# Patient Record
Sex: MALE | Race: WHITE | NOT HISPANIC OR LATINO | Employment: STUDENT | URBAN - METROPOLITAN AREA
[De-identification: names, ages, dates, MRNs, and addresses within clinical notes are randomized per-mention and may not be internally consistent; named-entity substitution may affect disease eponyms.]

---
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
## 2024-06-20 ENCOUNTER — OFFICE VISIT (OUTPATIENT)
Dept: FAMILY MEDICINE CLINIC | Facility: CLINIC | Age: 19
End: 2024-06-20
Payer: COMMERCIAL

## 2024-06-20 VITALS
BODY MASS INDEX: 24.87 KG/M2 | OXYGEN SATURATION: 99 % | WEIGHT: 200 LBS | HEART RATE: 77 BPM | DIASTOLIC BLOOD PRESSURE: 70 MMHG | SYSTOLIC BLOOD PRESSURE: 116 MMHG | HEIGHT: 75 IN

## 2024-06-20 DIAGNOSIS — Z00.00 PREVENTATIVE HEALTH CARE: Primary | ICD-10-CM

## 2024-06-20 DIAGNOSIS — Z00.00 ANNUAL PHYSICAL EXAM: ICD-10-CM

## 2024-06-20 PROCEDURE — 99395 PREV VISIT EST AGE 18-39: CPT | Performed by: FAMILY MEDICINE

## 2024-06-20 NOTE — ASSESSMENT & PLAN NOTE
UTD on all vaccines.  Patient is requesting STD screening, ordered today.  Patient advised to check with insurance before he goes for the screening

## 2024-06-20 NOTE — PROGRESS NOTES
Adult Annual Physical  Name: Samuel Doyle      : 2005      MRN: [id number]  Encounter Provider: Damaris Skinner MD  Encounter Date: 2024   Encounter department: [facility]    Assessment & Plan   1. Preventative health care  Assessment & Plan:  UTD on all vaccines.  Patient is requesting STD screening, ordered today.  Patient advised to check with insurance before he goes for the screening  Orders:  -     Comprehensive metabolic panel  -     Lipid panel  -     Chlamydia/GC amplified DNA by PCR; Future  -     HIV 1/2 AG/AB w Reflex [facility] for 2 yr old and above; Future  -     RPR-Syphilis Screening (Total Syphilis IGG/IGM); Future  -     Hepatitis B surface antigen; Future  -     Hepatitis C antibody; Future  -     HSV TYPE 1,2 DNA PCR; Future    Immunizations and preventive care screenings were discussed with patient today. Appropriate education was printed on patient's after visit summary.    Counseling:  Dental Health: discussed importance of regular tooth brushing, flossing, and dental visits.  Exercise: the importance of regular exercise/physical activity was discussed. Recommend exercise 3-5 times per week for at least 30 minutes.       Depression Screening and Follow-up Plan: Patient was screened for depression during today's encounter. They screened negative with a PHQ-2 score of 0.        History of Present Illness     Patient is here for annual physical.  Does not have any specific questions or concerns today.  Requesting labs to follow-up on his lipid panel which was borderline high last year.  Patient has been trying to eat healthy and exercise regularly.  Also requesting to be screened for STD due to concerns of exposure.      Adult Annual Physical:  Patient presents for annual physical.     Diet and Physical Activity:  - Diet/Nutrition: well balanced diet.  - Exercise: walking.    Depression Screening:  - PHQ-2 Score: 0    General Health:  - Sleep: sleeps well.  -  "Hearing: normal hearing bilateral ears.  - Vision: no vision problems.  - Dental: regular dental visits.    Review of Systems   Constitutional: Negative.    Respiratory: Negative.     Cardiovascular: Negative.      No current outpatient medications on file prior to visit.     No current facility-administered medications on file prior to visit.        Objective     /70   Pulse 77   Ht 6' 3\" (1.905 m)   Wt 90.7 kg (200 lb)   SpO2 99%   BMI 25.00 kg/m²     Physical Exam  Constitutional:       Appearance: Normal appearance.   HENT:      Right Ear: Tympanic membrane normal.      Left Ear: Tympanic membrane normal.      Mouth/Throat:      Pharynx: No posterior oropharyngeal erythema.   Eyes:      Pupils: Pupils are equal, round, and reactive to light.   Cardiovascular:      Heart sounds: Normal heart sounds.   Pulmonary:      Breath sounds: Normal breath sounds.   Abdominal:      Palpations: Abdomen is soft.   Musculoskeletal:      Right lower leg: No edema.      Left lower leg: No edema.   Neurological:      Mental Status: He is oriented to person, place, and time.   Psychiatric:         Mood and Affect: Mood normal.       Administrative Statements         "

## 2024-06-26 LAB
ALBUMIN SERPL-MCNC: 4.4 G/DL (ref 4.3–5.2)
ALP SERPL-CCNC: 105 IU/L (ref 51–125)
ALT SERPL-CCNC: 22 IU/L (ref 0–44)
AST SERPL-CCNC: 27 IU/L (ref 0–40)
BILIRUB SERPL-MCNC: 0.6 MG/DL (ref 0–1.2)
BUN SERPL-MCNC: 22 MG/DL (ref 6–20)
BUN/CREAT SERPL: 18 (ref 9–20)
C TRACH RRNA SPEC QL NAA+PROBE: NEGATIVE
CALCIUM SERPL-MCNC: 9.1 MG/DL (ref 8.7–10.2)
CHLORIDE SERPL-SCNC: 104 MMOL/L (ref 96–106)
CHOLEST SERPL-MCNC: 225 MG/DL (ref 100–169)
CO2 SERPL-SCNC: 24 MMOL/L (ref 20–29)
CREAT SERPL-MCNC: 1.24 MG/DL (ref 0.76–1.27)
EGFR: 86 ML/MIN/1.73
GLOBULIN SER-MCNC: 2.3 G/DL (ref 1.5–4.5)
GLUCOSE SERPL-MCNC: 91 MG/DL (ref 70–99)
HBV SURFACE AG SERPL QL IA: NEGATIVE
HCV AB S/CO SERPL IA: NON REACTIVE
HDLC SERPL-MCNC: 58 MG/DL
HIV 1+2 AB+HIV1 P24 AG SERPL QL IA: NON REACTIVE
HSV1 DNA SPEC QL NAA+PROBE: NEGATIVE
HSV2 DNA SPEC QL NAA+PROBE: NEGATIVE
LDLC SERPL CALC-MCNC: 156 MG/DL (ref 0–109)
N GONORRHOEA RRNA SPEC QL NAA+PROBE: NEGATIVE
POTASSIUM SERPL-SCNC: 4.5 MMOL/L (ref 3.5–5.2)
PROT SERPL-MCNC: 6.7 G/DL (ref 6–8.5)
SL AMB VLDL CHOLESTEROL CALC: 11 MG/DL (ref 5–40)
SODIUM SERPL-SCNC: 140 MMOL/L (ref 134–144)
TRIGL SERPL-MCNC: 65 MG/DL (ref 0–89)

## 2024-08-30 ENCOUNTER — TELEPHONE (OUTPATIENT)
Dept: FAMILY MEDICINE CLINIC | Facility: CLINIC | Age: 19
End: 2024-08-30

## 2024-08-30 NOTE — TELEPHONE ENCOUNTER
----- Message from Damaris Skinner MD sent at 8/29/2024  6:52 PM EDT -----  Please call the patient back with results, all normal except cholesterol which continues to be elevated.  I would emphasize low-fat, plant-based diet and to continue monitoring lipid panel to ensure that it improves.

## 2025-06-23 ENCOUNTER — OFFICE VISIT (OUTPATIENT)
Dept: FAMILY MEDICINE CLINIC | Facility: CLINIC | Age: 20
End: 2025-06-23
Payer: COMMERCIAL

## 2025-06-23 VITALS
DIASTOLIC BLOOD PRESSURE: 74 MMHG | OXYGEN SATURATION: 99 % | HEART RATE: 87 BPM | BODY MASS INDEX: 24.87 KG/M2 | SYSTOLIC BLOOD PRESSURE: 122 MMHG | HEIGHT: 75 IN | WEIGHT: 200 LBS

## 2025-06-23 DIAGNOSIS — Z13.6 SCREENING FOR CARDIOVASCULAR CONDITION: ICD-10-CM

## 2025-06-23 DIAGNOSIS — Z13.0 SCREENING FOR DEFICIENCY ANEMIA: ICD-10-CM

## 2025-06-23 DIAGNOSIS — E55.9 VITAMIN D DEFICIENCY: ICD-10-CM

## 2025-06-23 DIAGNOSIS — Z11.3 SCREENING FOR STDS (SEXUALLY TRANSMITTED DISEASES): ICD-10-CM

## 2025-06-23 DIAGNOSIS — Z13.1 SCREENING FOR DIABETES MELLITUS: ICD-10-CM

## 2025-06-23 DIAGNOSIS — Z00.00 ANNUAL PHYSICAL EXAM: Primary | ICD-10-CM

## 2025-06-23 DIAGNOSIS — Z13.29 SCREENING FOR THYROID DISORDER: ICD-10-CM

## 2025-06-23 PROBLEM — M22.41 CHONDROMALACIA OF PATELLA, RIGHT: Status: ACTIVE | Noted: 2024-03-25

## 2025-06-23 PROBLEM — L91.8 CUTANEOUS SKIN TAGS: Status: RESOLVED | Noted: 2023-06-22 | Resolved: 2025-06-23

## 2025-06-23 PROBLEM — Z23 NEED FOR VACCINATION: Status: RESOLVED | Noted: 2022-05-24 | Resolved: 2025-06-23

## 2025-06-23 PROCEDURE — 99212 OFFICE O/P EST SF 10 MIN: CPT

## 2025-06-23 PROCEDURE — 99395 PREV VISIT EST AGE 18-39: CPT

## 2025-06-23 NOTE — PATIENT INSTRUCTIONS
"Patient Education     Routine physical for adults   The Basics   Written by the doctors and editors at [facility]   What is a physical? -- A physical is a routine visit, or \"check-up,\" with your doctor. You might also hear it called a \"wellness visit\" or \"preventive visit.\"  During each visit, the doctor will:   Ask about your physical and mental health   Ask about your habits, behaviors, and lifestyle   Do an exam   Give you vaccines if needed   Talk to you about any medicines you take   Give advice about your health   Answer your questions  Getting regular check-ups is an important part of taking care of your health. It can help your doctor find and treat any problems you have. But it's also important for preventing health problems.  A routine physical is different from a \"sick visit.\" A sick visit is when you see a doctor because of a health concern or problem. Since physicals are scheduled ahead of time, you can think about what you want to ask the doctor.  How often should I get a physical? -- It depends on your age and health. In general, for people age 21 years and older:   If you are younger than 50 years, you might be able to get a physical every 3 years.   If you are 50 years or older, your doctor might recommend a physical every year.  If you have an ongoing health condition, like diabetes or high blood pressure, your doctor will probably want to see you more often.  What happens during a physical? -- In general, each visit will include:   Physical exam - The doctor or nurse will check your height, weight, heart rate, and blood pressure. They will also look at your eyes and ears. They will ask about how you are feeling and whether you have any symptoms that bother you.   Medicines - It's a good idea to bring a list of all the medicines you take to each doctor visit. Your doctor will talk to you about your medicines and answer any questions. Tell them if you are having any side effects that bother you. You " "should also tell them if you are having trouble paying for any of your medicines.   Habits and behaviors - This includes:   Your diet   Your exercise habits   Whether you smoke, drink alcohol, or use drugs   Whether you are sexually active   Whether you feel safe at home  Your doctor will talk to you about things you can do to improve your health and lower your risk of health problems. They will also offer help and support. For example, if you want to quit smoking, they can give you advice and might prescribe medicines. If you want to improve your diet or get more physical activity, they can help you with this, too.   Lab tests, if needed - The tests you get will depend on your age and situation. For example, your doctor might want to check your:   Cholesterol   Blood sugar   Iron level   Vaccines - The recommended vaccines will depend on your age, health, and what vaccines you already had. Vaccines are very important because they can prevent certain serious or deadly infections.   Discussion of screening - \"Screening\" means checking for diseases or other health problems before they cause symptoms. Your doctor can recommend screening based on your age, risk, and preferences. This might include tests to check for:   Cancer, such as breast, prostate, cervical, ovarian, colorectal, prostate, lung, or skin cancer   Sexually transmitted infections, such as chlamydia and gonorrhea   Mental health conditions like depression and anxiety  Your doctor will talk to you about the different types of screening tests. They can help you decide which screenings to have. They can also explain what the results might mean.   Answering questions - The physical is a good time to ask the doctor or nurse questions about your health. If needed, they can refer you to other doctors or specialists, too.  Adults older than 65 years often need other care, too. As you get older, your doctor will talk to you about:   How to prevent falling at " home   Hearing or vision tests   Memory testing   How to take your medicines safely   Making sure that you have the help and support you need at home  All topics are updated as new evidence becomes available and our peer review process is complete.  This topic retrieved from [employer] on: May 02, 2024.  Topic 598641 Version 1.0  Release: 32.4.3 - C32.122  © 2024 UpToDate, Inc. and/or its affiliates. All rights reserved.  Consumer Information Use and Disclaimer   Disclaimer: This generalized information is a limited summary of diagnosis, treatment, and/or medication information. It is not meant to be comprehensive and should be used as a tool to help the user understand and/or assess potential diagnostic and treatment options. It does NOT include all information about conditions, treatments, medications, side effects, or risks that may apply to a specific patient. It is not intended to be medical advice or a substitute for the medical advice, diagnosis, or treatment of a health care provider based on the health care provider's examination and assessment of a patient's specific and unique circumstances. Patients must speak with a health care provider for complete information about their health, medical questions, and treatment options, including any risks or benefits regarding use of medications. This information does not endorse any treatments or medications as safe, effective, or approved for treating a specific patient. UpToDate, Inc. and its affiliates disclaim any warranty or liability relating to this information or the use thereof.The use of this information is governed by the Terms of Use, available at https://www.woltersAppknoxuwer.com/en/know/clinical-effectiveness-terms. 2024© UpToDate, Inc. and its affiliates and/or licensors. All rights reserved.  Copyright   © 2024 UpToDate, Inc. and/or its affiliates. All rights reserved.

## 2025-06-23 NOTE — PROGRESS NOTES
Adult Annual Physical  Name: Samuel Doyle      : 2005      MRN: [id number]  Encounter Provider: PRINCESS Paredes  Encounter Date: 2025   Encounter department: [facility]    :  Assessment & Plan  Annual physical exam  Routine labs ordered, will f/u with abnormalities as needed. Routine immunizations UTD, will receive influenza vaccine at school in the fall. F/u in 1 year or sooner as needed.       Screening for diabetes mellitus  No prior known history, will screen to rule out.  Orders:    Comprehensive metabolic panel; Future    Hemoglobin A1C; Future    Screening for deficiency anemia  CBC last checked in , will re-check panel.   Orders:    CBC and differential; Future    Screening for thyroid disorder  No prior known history, will screen to rule out.  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 Will re-check lipid panel. Discussed OTC supplements - omega 3, niacin. ASCVD risk <5%.   Orders:    Lipid Panel with Direct LDL reflex; Future    Screening for STDs (sexually transmitted diseases)  Per patient request, will f/u as needed.   Orders:    Chlamydia/GC amplified DNA by PCR; Future    HSV TYPE 1,2 DNA PCR; Future    Testosterone, free, total; Future    RPR-Syphilis Screening (Total Syphilis IGG/IGM); Future    Vitamin D deficiency  Last checked in , will repeat testing.  Orders:    Vitamin D 25 hydroxy; Future             Preventive Screenings:  - Diabetes Screening: orders placed  - Cholesterol Screening: orders placed   - Hepatitis C screening: screening up-to-date   - HIV screening: screening up-to-date   - Colon cancer screening: screening not indicated   - Lung cancer screening: screening not indicated   - Prostate cancer screening: screening not indicated     Immunizations:  - Immunizations due:  Influenza  - The patient declines recommended vaccines currently despite my recommendations      Counseling/Anticipatory Guidance:  - Alcohol: discussed moderation in alcohol intake and recommendations for healthy alcohol use.   - Drug use: discussed harms of illicit drug use and how it can negatively impact mental/physical health.   - Tobacco use: discussed harms of tobacco use and management options for quitting.   - Dental health: discussed importance of regular tooth brushing, flossing, and dental visits.   - Sexual health: discussed sexually transmitted diseases, partner selection, use of condoms, avoidance of unintended pregnancy, and contraceptive alternatives.   - Diet: discussed recommendations for a healthy/well-balanced diet.   - Exercise: the importance of regular exercise/physical activity was discussed. Recommend exercise 3-5 times per week for at least 30 minutes.   - Injury prevention: discussed safety/seat belts, safety helmets, smoke detectors, carbon monoxide detectors, and smoking near bedding or upholstery.       Depression Screening and Follow-up Plan: Patient was screened for depression during today's encounter. They screened negative with a PHQ-2 score of 0.          History of Present Illness     Adult Annual Physical:  Patient presents for annual physical. 19 year old male presents for annual wellness exam/transfer of care from another provider in the practice. He denies any known medical problems or conditions but reports his lipid panel has been elevated with routine labs. He does not take any daily medications. 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 Will be returning to school next week for summer program. He denies CP or SOB with activity. Denies HA, dizziness, or lightheadedness. Denies h/o asthma or allergies. Overall feels well and offers no major concerns. He is requesting routine blood work and STD panel to  "be done. Denies prior known STD history or current symptoms - reports non-monogamous relations. Denies cigarette smoking history, reports occasional edibles - few times per year and social drinking. .     Diet and Physical Activity:  - Diet/Nutrition: well balanced diet and portion control.  - Exercise: moderate cardiovascular exercise and 5-7 times a week on average. Ice hockey/weight lifting    Depression Screening:  - PHQ-2 Score: 0    General Health:  - Sleep: sleeps well.  - Hearing: normal hearing right ear.  - Vision: no vision problems.  - Dental: regular dental visits and brushes teeth twice daily.     Health:  - History of STDs: no.   - Urinary symptoms: none.     Advanced Care Planning:  - Has an advanced directive?: no    - Has a durable medical POA?: no    - ACP document given to patient?: no      Review of Systems   Constitutional:  Negative for activity change, appetite change, chills, fatigue and fever.   HENT:  Negative for congestion, ear pain, rhinorrhea and sore throat.    Eyes:  Negative for pain and visual disturbance.   Respiratory:  Negative for cough, chest tightness and shortness of breath.    Cardiovascular:  Negative for chest pain and palpitations.   Gastrointestinal:  Negative for abdominal pain, constipation, diarrhea, nausea and vomiting.   Genitourinary:  Negative for dysuria and hematuria.   Musculoskeletal:  Negative for arthralgias, back pain, myalgias and neck pain.   Skin:  Negative for color change and rash.   Allergic/Immunologic: Negative for environmental allergies and food allergies.   Neurological:  Negative for dizziness, seizures, syncope, light-headedness and headaches.   All other systems reviewed and are negative.        Objective   /74   Pulse 87   Ht 6' 3\" (1.905 m)   Wt 90.7 kg (200 lb)   SpO2 99%   BMI 25.00 kg/m²     Physical Exam  Vitals and nursing note reviewed.   Constitutional:       General: He is awake. He is not in acute distress.     " Appearance: Normal appearance. He is well-developed and normal weight.   HENT:      Head: Normocephalic and atraumatic.      Right Ear: Tympanic membrane, ear canal and external ear normal.      Left Ear: Tympanic membrane, ear canal and external ear normal.      Nose: No congestion or rhinorrhea.      Mouth/Throat:      Mouth: Mucous membranes are moist.     Eyes:      Conjunctiva/sclera: Conjunctivae normal.       Cardiovascular:      Rate and Rhythm: Normal rate and regular rhythm.      Pulses: Normal pulses.      Heart sounds: Normal heart sounds. No murmur heard.  Pulmonary:      Effort: Pulmonary effort is normal. No respiratory distress.      Breath sounds: Normal breath sounds.   Abdominal:      General: Bowel sounds are normal.      Palpations: Abdomen is soft.      Tenderness: There is no abdominal tenderness. There is no right CVA tenderness, left CVA tenderness or guarding.     Musculoskeletal:         General: No swelling. Normal range of motion.      Cervical back: Normal range of motion and neck supple.     Skin:     General: Skin is warm and dry.      Capillary Refill: Capillary refill takes less than 2 seconds.     Neurological:      General: No focal deficit present.      Mental Status: He is alert and oriented to person, place, and time.      Motor: Motor function is intact.      Coordination: Coordination is intact.      Gait: Gait is intact.     Psychiatric:         Mood and Affect: Mood normal.         Behavior: Behavior normal. Behavior is cooperative.         Thought Content: Thought content normal.         Judgment: Judgment normal.

## 2025-06-25 DIAGNOSIS — E78.2 MIXED HYPERLIPIDEMIA: Primary | ICD-10-CM

## 2025-06-27 LAB
25(OH)D3+25(OH)D2 SERPL-MCNC: 43.4 NG/ML (ref 30–100)
ALBUMIN SERPL-MCNC: 4.6 G/DL (ref 4.3–5.2)
ALP SERPL-CCNC: 96 IU/L (ref 51–125)
ALT SERPL-CCNC: 20 IU/L (ref 0–44)
AST SERPL-CCNC: 27 IU/L (ref 0–40)
BASOPHILS # BLD AUTO: 0.1 X10E3/UL (ref 0–0.2)
BASOPHILS NFR BLD AUTO: 1 %
BILIRUB SERPL-MCNC: 0.5 MG/DL (ref 0–1.2)
BUN SERPL-MCNC: 28 MG/DL (ref 6–20)
BUN/CREAT SERPL: 22 (ref 9–20)
C TRACH RRNA SPEC QL NAA+PROBE: NEGATIVE
CALCIUM SERPL-MCNC: 9.3 MG/DL (ref 8.7–10.2)
CHLORIDE SERPL-SCNC: 103 MMOL/L (ref 96–106)
CHOLEST SERPL-MCNC: 223 MG/DL (ref 100–199)
CO2 SERPL-SCNC: 20 MMOL/L (ref 20–29)
CREAT SERPL-MCNC: 1.27 MG/DL (ref 0.76–1.27)
EGFR: 83 ML/MIN/1.73
EOSINOPHIL # BLD AUTO: 0.1 X10E3/UL (ref 0–0.4)
EOSINOPHIL NFR BLD AUTO: 1 %
ERYTHROCYTE [DISTWIDTH] IN BLOOD BY AUTOMATED COUNT: 13.3 % (ref 11.6–15.4)
GLOBULIN SER-MCNC: 2.2 G/DL (ref 1.5–4.5)
GLUCOSE SERPL-MCNC: 88 MG/DL (ref 70–99)
HBA1C MFR BLD: 5.2 % (ref 4.8–5.6)
HCT VFR BLD AUTO: 45.3 % (ref 37.5–51)
HDLC SERPL-MCNC: 59 MG/DL
HGB BLD-MCNC: 15 G/DL (ref 13–17.7)
HSV1 DNA SPEC QL NAA+PROBE: NEGATIVE
HSV2 DNA SPEC QL NAA+PROBE: NEGATIVE
IMM GRANULOCYTES # BLD: 0 X10E3/UL (ref 0–0.1)
IMM GRANULOCYTES NFR BLD: 0 %
LDLC SERPL CALC-MCNC: 152 MG/DL (ref 0–99)
LYMPHOCYTES # BLD AUTO: 2 X10E3/UL (ref 0.7–3.1)
LYMPHOCYTES NFR BLD AUTO: 36 %
MCH RBC QN AUTO: 29.6 PG (ref 26.6–33)
MCHC RBC AUTO-ENTMCNC: 33.1 G/DL (ref 31.5–35.7)
MCV RBC AUTO: 89 FL (ref 79–97)
MONOCYTES # BLD AUTO: 0.4 X10E3/UL (ref 0.1–0.9)
MONOCYTES NFR BLD AUTO: 8 %
N GONORRHOEA RRNA SPEC QL NAA+PROBE: NEGATIVE
NEUTROPHILS # BLD AUTO: 3 X10E3/UL (ref 1.4–7)
NEUTROPHILS NFR BLD AUTO: 54 %
PLATELET # BLD AUTO: 255 X10E3/UL (ref 150–450)
POTASSIUM SERPL-SCNC: 4.2 MMOL/L (ref 3.5–5.2)
PROT SERPL-MCNC: 6.8 G/DL (ref 6–8.5)
RBC # BLD AUTO: 5.07 X10E6/UL (ref 4.14–5.8)
RPR SER QL: NON REACTIVE
SODIUM SERPL-SCNC: 139 MMOL/L (ref 134–144)
TESTOST FREE SERPL-MCNC: 13.2 PG/ML (ref 9.3–26.5)
TESTOST SERPL-MCNC: 475 NG/DL (ref 264–916)
TRIGL SERPL-MCNC: 68 MG/DL (ref 0–149)
TSH SERPL DL<=0.005 MIU/L-ACNC: 2.22 UIU/ML (ref 0.45–4.5)
WBC # BLD AUTO: 5.6 X10E3/UL (ref 3.4–10.8)

## 2025-08-07 ENCOUNTER — OFFICE VISIT (OUTPATIENT)
Age: 20
End: 2025-08-07
Payer: COMMERCIAL

## 2025-08-18 ENCOUNTER — DOCUMENTATION (OUTPATIENT)
Dept: PSYCHIATRY | Facility: CLINIC | Age: 20
End: 2025-08-18